# Patient Record
Sex: FEMALE | Race: WHITE | Employment: STUDENT | URBAN - METROPOLITAN AREA
[De-identification: names, ages, dates, MRNs, and addresses within clinical notes are randomized per-mention and may not be internally consistent; named-entity substitution may affect disease eponyms.]

---
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
## 2023-01-24 ENCOUNTER — OFFICE VISIT (OUTPATIENT)
Dept: FAMILY MEDICINE | Facility: CLINIC | Age: 18
End: 2023-01-24
Payer: COMMERCIAL

## 2023-01-24 VITALS
SYSTOLIC BLOOD PRESSURE: 112 MMHG | BODY MASS INDEX: 21.67 KG/M2 | WEIGHT: 107.3 LBS | HEART RATE: 80 BPM | DIASTOLIC BLOOD PRESSURE: 70 MMHG | TEMPERATURE: 99.2 F

## 2023-01-24 DIAGNOSIS — N39.0 ACUTE LOWER UTI: ICD-10-CM

## 2023-01-24 DIAGNOSIS — R30.0 DYSURIA: Primary | ICD-10-CM

## 2023-01-24 LAB
ALBUMIN UR-MCNC: 100 MG/DL
APPEARANCE UR: CLEAR
BACTERIA #/AREA URNS HPF: ABNORMAL /HPF
BILIRUB UR QL STRIP: NEGATIVE
COLOR UR AUTO: YELLOW
GLUCOSE UR STRIP-MCNC: NEGATIVE MG/DL
HGB UR QL STRIP: ABNORMAL
KETONES UR STRIP-MCNC: NEGATIVE MG/DL
LEUKOCYTE ESTERASE UR QL STRIP: ABNORMAL
NITRATE UR QL: NEGATIVE
PH UR STRIP: 7 [PH] (ref 5–7)
RBC #/AREA URNS AUTO: ABNORMAL /HPF
SP GR UR STRIP: 1.01 (ref 1–1.03)
TRANS CELLS #/AREA URNS HPF: ABNORMAL /HPF
UROBILINOGEN UR STRIP-ACNC: 0.2 E.U./DL
WBC #/AREA URNS AUTO: ABNORMAL /HPF

## 2023-01-24 PROCEDURE — 87086 URINE CULTURE/COLONY COUNT: CPT | Performed by: PEDIATRICS

## 2023-01-24 PROCEDURE — 87186 SC STD MICRODIL/AGAR DIL: CPT | Performed by: PEDIATRICS

## 2023-01-24 PROCEDURE — 81001 URINALYSIS AUTO W/SCOPE: CPT | Performed by: PEDIATRICS

## 2023-01-24 PROCEDURE — 99213 OFFICE O/P EST LOW 20 MIN: CPT | Performed by: PEDIATRICS

## 2023-01-24 RX ORDER — SULFAMETHOXAZOLE/TRIMETHOPRIM 800-160 MG
1 TABLET ORAL 2 TIMES DAILY
Qty: 14 TABLET | Refills: 0 | Status: SHIPPED | OUTPATIENT
Start: 2023-01-24 | End: 2023-01-31

## 2023-01-24 NOTE — PROGRESS NOTES
Assessment & Plan         (N39.0) Acute lower UTI          Plan:    Start Bactrim twice daily x7 days for UTI.  Await urine culture.  Will notify if we need to use a different antibiotic.  Recommended she get her annual chlamydia screen.  Return to clinic if not improving as anticipated.    Olivia Reese MD on 1/24/2023 at 6:44 PM          Brandi Lorenz is a 17 year old, presenting for the following health issues:  Urinary Problem      History of Present Illness       Reason for visit:  Bladder infection  Symptom onset:  Today  Symptoms include:  Blood in urine, hurts to pee  Symptom intensity:  Moderate  Symptom progression:  Staying the same  Had these symptoms before:  No  What makes it worse:  No  What makes it better:  No    She eats 2-3 servings of fruits and vegetables daily.She consumes 1 sweetened beverage(s) daily.She exercises with enough effort to increase her heart rate 30 to 60 minutes per day.  She exercises with enough effort to increase her heart rate 4 days per week.   She is taking medications regularly.     Here today for possible UTI.  In the middle of the night woke up and has had increased urinary  Frequency.  Has had blood in her urine.  Has had urgency in the past but never blood before.  No fever.  NO belly or back pain.  Ibuprofen helped belly pain.  Some nausea.  No vaginal discharge issue.      LMP: 1/13/23.    Sexually active males, uses condoms.  Has not yet had annual chlamydia test.      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            Objective    /70 (BP Location: Right arm, Patient Position: Sitting, Cuff Size: Adult Regular)   Pulse 80   Temp 99.2  F (37.3  C) (Tympanic)   Wt 48.7 kg (107 lb 4.8 oz)   LMP 01/13/2023 (Exact Date)   BMI 21.67 kg/m    Body mass index is 21.67 kg/m .     Physical Exam     General: Bright affect, good eye contact, no distress.  Abdominal exam: Positive bowel sounds, no hepatosplenomegaly,  no mass, nondistended nontender.  No CVA tenderness bilaterally.         Latest Reference Range & Units 01/24/23 17:31   Color Urine Colorless, Straw, Light Yellow, Yellow  Yellow   Appearance Urine Clear  Clear   Glucose Urine Negative mg/dL Negative   Bilirubin Urine Negative  Negative   Ketones Urine Negative mg/dL Negative   Specific Gravity Urine 1.003 - 1.035  1.015   pH Urine 5.0 - 7.0  7.0   Protein Albumin Urine Negative mg/dL 100 !   Urobilinogen Urine 0.2, 1.0 E.U./dL 0.2   Nitrite Urine Negative  Negative   Blood Urine Negative  Large !   Leukocyte Esterase Urine Negative  Small !   WBC Urine 0-5 /HPF /HPF 10-25 !   RBC Urine 0-2 /HPF /HPF 10-25 !   Bacteria Urine None Seen /HPF Many !   Transitional Epi None Seen /HPF Few !   !: Data is abnormal

## 2023-01-27 LAB — BACTERIA UR CULT: ABNORMAL

## 2023-04-16 ENCOUNTER — HEALTH MAINTENANCE LETTER (OUTPATIENT)
Age: 18
End: 2023-04-16

## 2023-11-16 ENCOUNTER — PATIENT OUTREACH (OUTPATIENT)
Dept: CARE COORDINATION | Facility: CLINIC | Age: 18
End: 2023-11-16
Payer: COMMERCIAL

## 2023-11-20 ASSESSMENT — ANXIETY QUESTIONNAIRES
2. NOT BEING ABLE TO STOP OR CONTROL WORRYING: NEARLY EVERY DAY
4. TROUBLE RELAXING: NEARLY EVERY DAY
3. WORRYING TOO MUCH ABOUT DIFFERENT THINGS: NEARLY EVERY DAY
7. FEELING AFRAID AS IF SOMETHING AWFUL MIGHT HAPPEN: MORE THAN HALF THE DAYS
5. BEING SO RESTLESS THAT IT IS HARD TO SIT STILL: MORE THAN HALF THE DAYS
6. BECOMING EASILY ANNOYED OR IRRITABLE: NEARLY EVERY DAY
GAD7 TOTAL SCORE: 19
GAD7 TOTAL SCORE: 19
1. FEELING NERVOUS, ANXIOUS, OR ON EDGE: NEARLY EVERY DAY
IF YOU CHECKED OFF ANY PROBLEMS ON THIS QUESTIONNAIRE, HOW DIFFICULT HAVE THESE PROBLEMS MADE IT FOR YOU TO DO YOUR WORK, TAKE CARE OF THINGS AT HOME, OR GET ALONG WITH OTHER PEOPLE: VERY DIFFICULT

## 2023-11-20 ASSESSMENT — PATIENT HEALTH QUESTIONNAIRE - PHQ9
SUM OF ALL RESPONSES TO PHQ QUESTIONS 1-9: 12
10. IF YOU CHECKED OFF ANY PROBLEMS, HOW DIFFICULT HAVE THESE PROBLEMS MADE IT FOR YOU TO DO YOUR WORK, TAKE CARE OF THINGS AT HOME, OR GET ALONG WITH OTHER PEOPLE: VERY DIFFICULT
SUM OF ALL RESPONSES TO PHQ QUESTIONS 1-9: 12

## 2023-11-27 ENCOUNTER — OFFICE VISIT (OUTPATIENT)
Dept: FAMILY MEDICINE | Facility: CLINIC | Age: 18
End: 2023-11-27
Payer: COMMERCIAL

## 2023-11-27 VITALS
TEMPERATURE: 98.3 F | DIASTOLIC BLOOD PRESSURE: 72 MMHG | HEIGHT: 59 IN | OXYGEN SATURATION: 99 % | WEIGHT: 105.2 LBS | SYSTOLIC BLOOD PRESSURE: 112 MMHG | RESPIRATION RATE: 16 BRPM | BODY MASS INDEX: 21.21 KG/M2 | HEART RATE: 84 BPM

## 2023-11-27 DIAGNOSIS — L65.9 HAIR LOSS: ICD-10-CM

## 2023-11-27 DIAGNOSIS — F41.9 ANXIETY: Primary | ICD-10-CM

## 2023-11-27 DIAGNOSIS — Z11.4 SCREENING FOR HIV (HUMAN IMMUNODEFICIENCY VIRUS): ICD-10-CM

## 2023-11-27 LAB
BASOPHILS # BLD AUTO: 0 10E3/UL (ref 0–0.2)
BASOPHILS NFR BLD AUTO: 1 %
EOSINOPHIL # BLD AUTO: 0.2 10E3/UL (ref 0–0.7)
EOSINOPHIL NFR BLD AUTO: 4 %
ERYTHROCYTE [DISTWIDTH] IN BLOOD BY AUTOMATED COUNT: 11.8 % (ref 10–15)
HCT VFR BLD AUTO: 38.9 % (ref 35–47)
HGB BLD-MCNC: 13.2 G/DL (ref 11.7–15.7)
HIV 1+2 AB+HIV1 P24 AG SERPL QL IA: NONREACTIVE
IMM GRANULOCYTES # BLD: 0 10E3/UL
IMM GRANULOCYTES NFR BLD: 0 %
LYMPHOCYTES # BLD AUTO: 1.9 10E3/UL (ref 0.8–5.3)
LYMPHOCYTES NFR BLD AUTO: 46 %
MCH RBC QN AUTO: 31 PG (ref 26.5–33)
MCHC RBC AUTO-ENTMCNC: 33.9 G/DL (ref 31.5–36.5)
MCV RBC AUTO: 91 FL (ref 78–100)
MONOCYTES # BLD AUTO: 0.3 10E3/UL (ref 0–1.3)
MONOCYTES NFR BLD AUTO: 8 %
NEUTROPHILS # BLD AUTO: 1.8 10E3/UL (ref 1.6–8.3)
NEUTROPHILS NFR BLD AUTO: 42 %
PLATELET # BLD AUTO: 219 10E3/UL (ref 150–450)
RBC # BLD AUTO: 4.26 10E6/UL (ref 3.8–5.2)
TSH SERPL DL<=0.005 MIU/L-ACNC: 1.85 UIU/ML (ref 0.3–4.2)
WBC # BLD AUTO: 4.2 10E3/UL (ref 4–11)

## 2023-11-27 PROCEDURE — 36415 COLL VENOUS BLD VENIPUNCTURE: CPT | Performed by: PEDIATRICS

## 2023-11-27 PROCEDURE — 99214 OFFICE O/P EST MOD 30 MIN: CPT | Mod: 25 | Performed by: PEDIATRICS

## 2023-11-27 PROCEDURE — 90471 IMMUNIZATION ADMIN: CPT | Performed by: PEDIATRICS

## 2023-11-27 PROCEDURE — 84443 ASSAY THYROID STIM HORMONE: CPT | Performed by: PEDIATRICS

## 2023-11-27 PROCEDURE — 85025 COMPLETE CBC W/AUTO DIFF WBC: CPT | Mod: QW | Performed by: PEDIATRICS

## 2023-11-27 PROCEDURE — 90715 TDAP VACCINE 7 YRS/> IM: CPT | Performed by: PEDIATRICS

## 2023-11-27 PROCEDURE — 87389 HIV-1 AG W/HIV-1&-2 AB AG IA: CPT | Performed by: PEDIATRICS

## 2023-11-27 RX ORDER — SERTRALINE HYDROCHLORIDE 25 MG/1
25 TABLET, FILM COATED ORAL DAILY
Qty: 30 TABLET | Refills: 3 | Status: SHIPPED | OUTPATIENT
Start: 2023-11-27 | End: 2023-12-18

## 2023-11-27 ASSESSMENT — ENCOUNTER SYMPTOMS: NERVOUS/ANXIOUS: 1

## 2023-11-27 NOTE — PROGRESS NOTES
Assessment & Plan     Anxiety    - sertraline (ZOLOFT) 25 MG tablet; Take 1 tablet (25 mg) by mouth daily    Screening for HIV (human immunodeficiency virus)    - HIV Antigen Antibody Combo; Future  - HIV Antigen Antibody Combo    Hair loss    - CBC with platelets and differential; Future  - TSH with free T4 reflex; Future  - CBC with platelets and differential  - TSH with free T4 reflex       Depression Screening Follow Up        11/20/2023     3:18 PM   PHQ   PHQ-9 Total Score 12   Q9: Thoughts of better off dead/self-harm past 2 weeks Not at all         Plan:    Will start low-dose SSRI sertraline 25 mg once daily.  Reviewed box warning.  Will plan to see her back in 2 to 3 weeks for possible titration.  Will ask Jaqueline to reach out to her to start some cognitive behavioral therapy.  She also acknowledges that there is options on campus if needed.  Will check a baseline CBC, TSH and HIV screen.  Return to clinic in 2 to 3 weeks for recheck, sooner if needed.    Olivia Reese MD on 11/27/2023 at 7:20 AM    For billing purposes only: I spent 30 minutes on the date of the encounter during chart review, history and exam, documentation and further activities as noted above.    Brandi Lorenz is a 18 year old, presenting for the following health issues:  Thyroid Problem (Family hx of hypothyroidism on mom's side (mom and grandma)/Puffy face, weight gain x 1 year; noticed hair loss about a month or 2 ago) and Anxiety (Would like to discuss starting medication - has gotten worse this semester )      11/27/2023     6:56 AM   Additional Questions   Roomed by JANY Correa   Accompanied by self       Anxiety    History of Present Illness       Mental Health Follow-up:  Patient presents to follow-up on Anxiety.    Patient's anxiety since last visit has been:  Worse  The patient is having other symptoms associated with anxiety.  Any significant life events: No  Patient is feeling anxious or having panic attacks.  Patient  has no concerns about alcohol or drug use.    She eats 2-3 servings of fruits and vegetables daily.She consumes 0 sweetened beverage(s) daily.She exercises with enough effort to increase her heart rate 20 to 29 minutes per day.  She exercises with enough effort to increase her heart rate 3 or less days per week.   She is taking medications regularly.                 Social History     Tobacco Use    Smoking status: Never     Passive exposure: Never    Smokeless tobacco: Never    Tobacco comments:     No smokers in home   Vaping Use    Vaping Use: Never used   Substance Use Topics    Alcohol use: No    Drug use: No         11/20/2023     3:19 PM   ZULY-7 SCORE   Total Score 19 (severe anxiety)   Total Score 19         11/20/2023     3:18 PM   PHQ   PHQ-9 Total Score 12   Q9: Thoughts of better off dead/self-harm past 2 weeks Not at all         Here today with concerns about anxiety.    Over the last several months has noticed she has had increased frequency of feeling anxious at baseline.  She is having difficulty focusing in class.  Some troubles falling asleep at night.  She feels like she is always on the go and moving.  She has baseline history of anxiety but never barbara to a point where she felt she needed to do something different.  Is interested in medication and therapy.    Also worried about thyroid issues.  Mother and maternal grandmother diagnosed in their 20s with hypothyroidism.  She feels that she has had some hair loss and weight fluctuation.    Appetite is okay although she acknowledges she does not eat as much in the dining garnica due to options.  She sometimes worries that she has not eaten enough during the day and then overeats or binges in the evening.  Denies any past history of eating disorder issue.    Menstrual cycle has been regular.  She does feel fatigued especially after eating.      Review of Systems   Psychiatric/Behavioral:  The patient is nervous/anxious.             Objective    BP  "112/72   Pulse 84   Temp 98.3  F (36.8  C) (Tympanic)   Resp 16   Ht 1.499 m (4' 11.02\")   Wt 47.7 kg (105 lb 3.2 oz)   LMP 11/16/2023 (Exact Date)   SpO2 99%   BMI 21.24 kg/m    Body mass index is 21.24 kg/m .      Physical Exam     General:  Alert and oriented, No acute distress.    Neck:  No lymphadenopathy.  No thyromegaly.  Respiratory:  Lungs clear to auscultation bilaterally.  Equal air entry.  Symmetrical chest expansion.  No wheezing.    Cardiovascular:  S1 and S2 with regular rate and rhythm.  No murmurs.    Neurologic:  No focal deficits.                "

## 2023-11-28 ENCOUNTER — TELEPHONE (OUTPATIENT)
Dept: PEDIATRICS | Facility: OTHER | Age: 18
End: 2023-11-28
Payer: COMMERCIAL

## 2023-11-28 NOTE — TELEPHONE ENCOUNTER
11/28/23  Pt's mom called to get assistance in determining if [facility] Luna is in her insurance network. She stated she already spoke to the business office and they did not know how to help. Gave mom the number for behavioral: [phone]  [place]

## 2023-11-30 ENCOUNTER — PATIENT OUTREACH (OUTPATIENT)
Dept: CARE COORDINATION | Facility: CLINIC | Age: 18
End: 2023-11-30
Payer: COMMERCIAL

## 2023-12-17 ASSESSMENT — ANXIETY QUESTIONNAIRES
2. NOT BEING ABLE TO STOP OR CONTROL WORRYING: SEVERAL DAYS
6. BECOMING EASILY ANNOYED OR IRRITABLE: MORE THAN HALF THE DAYS
1. FEELING NERVOUS, ANXIOUS, OR ON EDGE: SEVERAL DAYS
GAD7 TOTAL SCORE: 8
GAD7 TOTAL SCORE: 8
3. WORRYING TOO MUCH ABOUT DIFFERENT THINGS: MORE THAN HALF THE DAYS
4. TROUBLE RELAXING: SEVERAL DAYS
7. FEELING AFRAID AS IF SOMETHING AWFUL MIGHT HAPPEN: NOT AT ALL
IF YOU CHECKED OFF ANY PROBLEMS ON THIS QUESTIONNAIRE, HOW DIFFICULT HAVE THESE PROBLEMS MADE IT FOR YOU TO DO YOUR WORK, TAKE CARE OF THINGS AT HOME, OR GET ALONG WITH OTHER PEOPLE: SOMEWHAT DIFFICULT
5. BEING SO RESTLESS THAT IT IS HARD TO SIT STILL: SEVERAL DAYS

## 2023-12-18 ENCOUNTER — OFFICE VISIT (OUTPATIENT)
Dept: FAMILY MEDICINE | Facility: CLINIC | Age: 18
End: 2023-12-18
Payer: COMMERCIAL

## 2023-12-18 VITALS
TEMPERATURE: 97.9 F | HEIGHT: 60 IN | DIASTOLIC BLOOD PRESSURE: 64 MMHG | WEIGHT: 104.5 LBS | RESPIRATION RATE: 18 BRPM | SYSTOLIC BLOOD PRESSURE: 116 MMHG | OXYGEN SATURATION: 99 % | BODY MASS INDEX: 20.52 KG/M2 | HEART RATE: 74 BPM

## 2023-12-18 DIAGNOSIS — F41.9 ANXIETY: Primary | ICD-10-CM

## 2023-12-18 PROCEDURE — 99213 OFFICE O/P EST LOW 20 MIN: CPT | Performed by: PEDIATRICS

## 2023-12-18 ASSESSMENT — PATIENT HEALTH QUESTIONNAIRE - PHQ9
10. IF YOU CHECKED OFF ANY PROBLEMS, HOW DIFFICULT HAVE THESE PROBLEMS MADE IT FOR YOU TO DO YOUR WORK, TAKE CARE OF THINGS AT HOME, OR GET ALONG WITH OTHER PEOPLE: SOMEWHAT DIFFICULT
SUM OF ALL RESPONSES TO PHQ QUESTIONS 1-9: 8
10. IF YOU CHECKED OFF ANY PROBLEMS, HOW DIFFICULT HAVE THESE PROBLEMS MADE IT FOR YOU TO DO YOUR WORK, TAKE CARE OF THINGS AT HOME, OR GET ALONG WITH OTHER PEOPLE: SOMEWHAT DIFFICULT

## 2023-12-18 NOTE — PROGRESS NOTES
Assessment & Plan     Anxiety    - sertraline (ZOLOFT) 50 MG tablet; Take 1 tablet (50 mg) by mouth daily        Plan:    Will increase Zoloft to 50 mg once daily.  Agree with getting in with Jaqueline.  Discussed consideration of hepatitis A vaccine prior to her trip to [place].  Encouraged her to discuss with her mom whether this was something she had received as a child or not. Can schedule vaccine only appointment if needed.   Return to clinic in 6 to 8 weeks for follow-up, sooner if needed.    Olivia Reese MD on 12/18/2023 at 11:01 AM      Brandi Lorenz is a 18 year old, presenting for the following health issues:  Medication Therapy Management      12/18/2023    10:34 AM   Additional Questions   Roomed by Iva       History of Present Illness       Mental Health Follow-up:  Patient presents to follow-up on Anxiety.    Patient's anxiety since last visit has been:  Medium  The patient is not having other symptoms associated with anxiety.  Any significant life events: No  Patient is feeling anxious or having panic attacks.  Patient has no concerns about alcohol or drug use.    She eats 2-3 servings of fruits and vegetables daily.She consumes 0 sweetened beverage(s) daily.She exercises with enough effort to increase her heart rate 20 to 29 minutes per day.  She exercises with enough effort to increase her heart rate 4 days per week.   She is taking medications regularly.       Here today for follow-up on anxiety medications.  Overall feels things are going well.  Medications have taken the edge of the anxiety but it does still seem to be there.  She notes she has finals coming up.  Will then be going on a cruise with her family over break.  Has felt a little sluggish.  Not sure if that is the medication or something else.  She has had trouble falling asleep a few times when she has gone home for the weekend but otherwise does not have any sleep concerns.  Appetite is about the same.  Finds the cafeteria  "food to be an issue but does not have concerns about her intake right now.  Will be meeting with Jaqueline tomorrow for her first visit.        Objective    /64 (BP Location: Right arm, Patient Position: Sitting, Cuff Size: Adult Regular)   Pulse 74   Temp 97.9  F (36.6  C) (Oral)   Resp 18   Ht 1.528 m (5' 0.14\")   Wt 47.4 kg (104 lb 8 oz)   LMP 12/17/2023 (Exact Date)   SpO2 99%   BMI 20.32 kg/m    Body mass index is 20.32 kg/m .    Physical Exam     General: Bright affect, good eye contact, mood congruent, well-appearing              11/20/2023     3:18 PM 12/18/2023     8:42 AM   PHQ   PHQ-9 Total Score 12 8    8   Q9: Thoughts of better off dead/self-harm past 2 weeks Not at all Not at all    Not at all         11/20/2023     3:19 PM 12/17/2023     6:01 PM   ZULY-7 SCORE   Total Score 19 (severe anxiety) 8 (mild anxiety)   Total Score 19 8             "

## 2023-12-19 ENCOUNTER — OFFICE VISIT (OUTPATIENT)
Dept: BEHAVIORAL HEALTH | Facility: CLINIC | Age: 18
End: 2023-12-19
Payer: COMMERCIAL

## 2023-12-19 DIAGNOSIS — F41.9 ANXIETY DISORDER, UNSPECIFIED TYPE: Primary | ICD-10-CM

## 2023-12-19 PROCEDURE — 90834 PSYTX W PT 45 MINUTES: CPT | Performed by: SOCIAL WORKER

## 2023-12-19 ASSESSMENT — COLUMBIA-SUICIDE SEVERITY RATING SCALE - C-SSRS
1. HAVE YOU WISHED YOU WERE DEAD OR WISHED YOU COULD GO TO SLEEP AND NOT WAKE UP?: NO
6. HAVE YOU EVER DONE ANYTHING, STARTED TO DO ANYTHING, OR PREPARED TO DO ANYTHING TO END YOUR LIFE?: NO
TOTAL  NUMBER OF ABORTED OR SELF INTERRUPTED ATTEMPTS LIFETIME: NO
2. HAVE YOU ACTUALLY HAD ANY THOUGHTS OF KILLING YOURSELF?: NO
ATTEMPT LIFETIME: NO
TOTAL  NUMBER OF INTERRUPTED ATTEMPTS LIFETIME: NO

## 2023-12-19 NOTE — PROGRESS NOTES
[facility] Primary Care: Integrated Behavioral Health  December 19, 2023      Behavioral Health Clinician Progress Note    Patient Name: Gerda Pop           Service Type:  Individual      Service Location:   Face to Face in Clinic     Session Start Time: 8:20a  Session End Time: 8:10a      Session Length: 38 - 52      Attendees: Client     Service Modality:  In-person    Visit Activities (Refresh list every visit): [facility] Only    Diagnostic Assessment Date: to be completed by 3rd visit  Treatment Plan Review Date: to be completed after DA  See Flowsheets for today's PHQ-9 and ZULY-7 results  Previous PHQ-9:       11/20/2023     3:18 PM 12/18/2023     8:42 AM   PHQ-9 SCORE   PHQ-9 Total Score MyChart 12 (Moderate depression) 8 (Mild depression)   PHQ-9 Total Score 12 8    8     Previous ZULY-7:       11/20/2023     3:19 PM 12/17/2023     6:01 PM   ZULY-7 SCORE   Total Score 19 (severe anxiety) 8 (mild anxiety)   Total Score 19 8         DATA  Extended Session (60+ minutes): No  Interactive Complexity: No  Crisis: No  [facility] Patient: No    Treatment Objective(s) Addressed in This Session:  Target Behavior(s): disease management/lifestyle changes anxiety, stress    Anxiety: will experience a reduction in anxiety, will develop more effective coping skills to manage anxiety symptoms, and will develop healthy cognitive patterns and beliefs    Current Stressors / Issues:  This is pt's initial visit with [facility].  Pt was referred by PCP due to increased anxiety.  Pt shares that she has been experiencing increased anxiety the past several months primarily related to classes.  Pt shares that she is a sophomore at [employer] and is studying Bio Medical studies.  Pt shares that she will transfer to the [employer] for her senior year.  Pt lives in the dorms with her best friend from High School and reports that things are going well with her living situation.  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  Pt is from [place] and family still lives there.  Pt shares she is close to family and goes home sometimes on Saturdays.    This week is Finals week and has 1 final tomorrow and 2 on Thursday.  Plans to spend her entire break in [place] and will work at the [employer] there and family is also going on a [employer] Cruise.    Pt shares that her anxiety often related to social situations and also to homework and taking tests.  She admits to worrying about the future a lot.  Pt shares that she often sleeps ok but if she doesn't have much going on in a day she struggles with falling asleep at night.    C and pt discussed CBT and challenging anxious/negative thinking.  Discussed relaxation and grounding tools as well for pt to use when worrying and having physical anxiety.    Pt set up an appt for after break.  She was given number to call and arrange an appt during break if she feels it would be helpful after she has her work schedule.    Pt denies any SI.       Progress on Treatment Objective(s) / Homework:  Initial visit    Motivational Interviewing    MI Intervention: Expressed Empathy/Understanding, Supported Autonomy, Collaboration, Evocation, Permission to raise concern or advise, Open-ended questions, and Reflections: simple and complex     Change Talk Expressed by the Patient: Desire to change    Provider Response to Change Talk: E - Evoked more info from patient about behavior change, A - Affirmed patient's thoughts, decisions, or attempts at behavior change, R - Reflected patient's change talk, and S - Summarized patient's change talk statements    Also provided psychoeducation about behavioral health condition, symptoms, and treatment options    Cognitive-Behavioral Therapy    Assessments completed prior to visit:  The following assessments were completed by patient for this visit:  PHQ9:       11/20/2023     3:18 PM 12/18/2023     8:42 AM   PHQ-9 SCORE   PHQ-9 Total Score  MyChart 12 (Moderate depression) 8 (Mild depression)   PHQ-9 Total Score 12 8    8     GAD2:       12/17/2023     6:30 PM   ZULY-2   Feeling nervous, anxious, or on edge 2   Not being able to stop or control worrying 1   ZULY-2 Total Score 3     GAD7:       11/20/2023     3:19 PM 12/17/2023     6:01 PM   ZULY-7 SCORE   Total Score 19 (severe anxiety) 8 (mild anxiety)   Total Score 19 8     CAGE-AID:       12/17/2023     6:33 PM   CAGE-AID Total Score   Total Score 2   Total Score MyChart 2 (A total score of 2 or greater is considered clinically significant)     PROMIS 10-Global Health (only subscores and total score):       12/17/2023     6:32 PM   PROMIS-10 Scores Only   Global Mental Health Score 12   Global Physical Health Score 14   PROMIS TOTAL - SUBSCORES 26     [place]vin Suicide Severity Rating Scale (Lifetime/Recent)      12/19/2023     9:35 AM   [place] Suicide Severity Rating (Lifetime/Recent)   Q1 Wish to be Dead (Lifetime) N   Q2 Non-Specific Active Suicidal Thoughts (Lifetime) N   Actual Attempt (Lifetime) N   Has subject engaged in non-suicidal self-injurious behavior? (Lifetime) N   Interrupted Attempts (Lifetime) N   Aborted or Self-Interrupted Attempt (Lifetime) N   Preparatory Acts or Behavior (Lifetime) N   Calculated C-SSRS Risk Score (Lifetime/Recent) No Risk Indicated       Care Plan review completed: No    Medication Review:  Changes to psychiatric medications, see updated Medication List in EPIC. - recently started zoloft    Medication Compliance:  Yes    Changes in Health Issues:   None reported    Chemical Use Review:   Substance Use: Chemical use reviewed, no active concerns identified      Tobacco Use: No current tobacco use.      Assessment: Current Emotional / Mental Status (status of significant symptoms):  Risk status (Self / Other harm or suicidal ideation)  Patient denies a history of suicidal ideation, suicide attempts, self-injurious behavior, homicidal ideation, homicidal behavior,  and and other safety concerns  Patient denies current fears or concerns for personal safety.  Patient denies current or recent suicidal ideation or behaviors.  Patient denies current or recent homicidal ideation or behaviors.  Patient denies current or recent self injurious behavior or ideation.  Patient denies other safety concerns.  A safety and risk management plan has not been developed at this time, however patient was encouraged to call [facility] should there be a change in any of these risk factors.    Appearance:   Appropriate   Eye Contact:   Good   Psychomotor Behavior: Normal   Attitude:   Cooperative   Orientation:   All  Speech   Rate / Production: Normal    Volume:  Normal   Mood:    Anxious (mildly)  Affect:    Appropriate   Thought Content:  Clear   Thought Form:  Coherent  Logical   Insight:    Good     Diagnoses:  1. Anxiety disorder, unspecified type        Collateral Reports Completed:  Not Applicable    Plan: (Homework, other):  Patient was given information about behavioral services and encouraged to schedule a follow up appointment with the clinic [facility] in 1 month.  Pt given # she can call if wants to schedule virtual visit during break.  Doesn't have work schedule yet. She was also given information about mental health symptoms and treatment options .  CD Recommendations: No indications of CD issues.         Jaqueline Luna, ARVIN, LICSW, [facility]   December 19, 2023

## 2024-01-23 ENCOUNTER — OFFICE VISIT (OUTPATIENT)
Dept: BEHAVIORAL HEALTH | Facility: CLINIC | Age: 19
End: 2024-01-23
Payer: COMMERCIAL

## 2024-01-23 DIAGNOSIS — F41.9 ANXIETY DISORDER, UNSPECIFIED TYPE: Primary | ICD-10-CM

## 2024-01-23 PROCEDURE — 90834 PSYTX W PT 45 MINUTES: CPT | Performed by: SOCIAL WORKER

## 2024-01-23 NOTE — PROGRESS NOTES
[facility] Primary Care: Integrated Behavioral Health  January 23, 2024        Behavioral Health Clinician Progress Note    Patient Name: Gerda Pop           Service Type:  Individual      Service Location:   Face to Face in Clinic     Session Start Time: 10:30a  Session End Time: 11:13a      Session Length: 38 - 52      Attendees: Client     Service Modality:  In-person    Visit Activities (Refresh list every visit): [facility] Only    Diagnostic Assessment Date: to be completed by 3rd visit  Treatment Plan Review Date: to be completed after DA  See Flowsheets for today's PHQ-9 and ZULY-7 results  Previous PHQ-9:       11/20/2023     3:18 PM 12/18/2023     8:42 AM   PHQ-9 SCORE   PHQ-9 Total Score MyChart 12 (Moderate depression) 8 (Mild depression)   PHQ-9 Total Score 12 8    8     Previous ZULY-7:       11/20/2023     3:19 PM 12/17/2023     6:01 PM   ZULY-7 SCORE   Total Score 19 (severe anxiety) 8 (mild anxiety)   Total Score 19 8         DATA  Extended Session (60+ minutes): No  Interactive Complexity: No  Crisis: No  [facility] Patient: No    Treatment Objective(s) Addressed in This Session:  Target Behavior(s): disease management/lifestyle changes anxiety, stress    Anxiety: will experience a reduction in anxiety, will develop more effective coping skills to manage anxiety symptoms, and will develop healthy cognitive patterns and beliefs    Current Stressors / Issues:  Started back at school yesterday after a long winter break.  Pt shares that break went well though maybe had a bit too much time with family.  Pt shares that she has no classes on Tuesdays which she feels will be nice.  Does think that she will have a heavy workload though has some good friends in classes which is thankful for.  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    Admits that over break sleep was a little off.  Is going to work and try to get  back on a sleep schedule and stick to it.    Pt shares that she is using the grounding tools and challenging anxious thoughts and finding them helpful.  Pt not sure if needs to reschedule at this time.  [facility] will reach out to pt in about 1 month to check in.        Progress on Treatment Objective(s) / Homework:  Satisfactory progress - ACTION (Actively working towards change); Intervened by reinforcing change plan / affirming steps taken    Motivational Interviewing    MI Intervention: Expressed Empathy/Understanding, Supported Autonomy, Collaboration, Evocation, Permission to raise concern or advise, Open-ended questions, and Reflections: simple and complex     Change Talk Expressed by the Patient: Desire to change    Provider Response to Change Talk: E - Evoked more info from patient about behavior change, A - Affirmed patient's thoughts, decisions, or attempts at behavior change, R - Reflected patient's change talk, and S - Summarized patient's change talk statements    Also provided psychoeducation about behavioral health condition, symptoms, and treatment options    Cognitive-Behavioral Therapy    Assessments completed prior to visit:  The following assessments were completed by patient for this visit:  PHQ9:       11/20/2023     3:18 PM 12/18/2023     8:42 AM   PHQ-9 SCORE   PHQ-9 Total Score MyChart 12 (Moderate depression) 8 (Mild depression)   PHQ-9 Total Score 12 8    8     GAD2:       12/17/2023     6:30 PM   ZULY-2   Feeling nervous, anxious, or on edge 2   Not being able to stop or control worrying 1   ZULY-2 Total Score 3     GAD7:       11/20/2023     3:19 PM 12/17/2023     6:01 PM   ZULY-7 SCORE   Total Score 19 (severe anxiety) 8 (mild anxiety)   Total Score 19 8     CAGE-AID:       12/17/2023     6:33 PM   CAGE-AID Total Score   Total Score 2   Total Score MyChart 2 (A total score of 2 or greater is considered clinically significant)     PROMIS 10-Global Health (only subscores and total score):        12/17/2023     6:32 PM   PROMIS-10 Scores Only   Global Mental Health Score 12   Global Physical Health Score 14   PROMIS TOTAL - SUBSCORES 26     [place] Suicide Severity Rating Scale (Lifetime/Recent)      12/19/2023     9:35 AM   [place] Suicide Severity Rating (Lifetime/Recent)   Q1 Wish to be Dead (Lifetime) N   Q2 Non-Specific Active Suicidal Thoughts (Lifetime) N   Actual Attempt (Lifetime) N   Has subject engaged in non-suicidal self-injurious behavior? (Lifetime) N   Interrupted Attempts (Lifetime) N   Aborted or Self-Interrupted Attempt (Lifetime) N   Preparatory Acts or Behavior (Lifetime) N   Calculated C-SSRS Risk Score (Lifetime/Recent) No Risk Indicated       Care Plan review completed: No    Medication Review:  Changes to psychiatric medications, see updated Medication List in EPIC. - recently started zoloft    Medication Compliance:  Yes    Changes in Health Issues:   None reported    Chemical Use Review:   Substance Use: Chemical use reviewed, no active concerns identified      Tobacco Use: No current tobacco use.      Assessment: Current Emotional / Mental Status (status of significant symptoms):  Risk status (Self / Other harm or suicidal ideation)  Patient denies a history of suicidal ideation, suicide attempts, self-injurious behavior, homicidal ideation, homicidal behavior, and and other safety concerns  Patient denies current fears or concerns for personal safety.  Patient denies current or recent suicidal ideation or behaviors.  Patient denies current or recent homicidal ideation or behaviors.  Patient denies current or recent self injurious behavior or ideation.  Patient denies other safety concerns.  A safety and risk management plan has not been developed at this time, however patient was encouraged to call [place] [phone] should there be a change in any of these risk factors.    Appearance:   Appropriate   Eye Contact:   Good   Psychomotor Behavior: Normal   Attitude:   Cooperative    Orientation:   All  Speech   Rate / Production: Normal    Volume:  Normal   Mood:    Normal  Affect:    Appropriate   Thought Content:  Clear   Thought Form:  Coherent  Logical   Insight:    Good     Diagnoses:  1. Anxiety disorder, unspecified type          Collateral Reports Completed:  Not Applicable    Plan: (Homework, other):  Patient was given information about behavioral services and encouraged to schedule a follow up appointment with the clinic [facility] in 1 month, as needed.   She was also given information about mental health symptoms and treatment options .  CD Recommendations: No indications of CD issues.         ARVIN Lerma, LICSW, [facility]   January 23, 2024

## 2024-02-04 ENCOUNTER — HEALTH MAINTENANCE LETTER (OUTPATIENT)
Age: 19
End: 2024-02-04

## 2024-11-23 ENCOUNTER — OFFICE VISIT (OUTPATIENT)
Dept: URGENT CARE | Facility: URGENT CARE | Age: 19
End: 2024-11-23
Payer: COMMERCIAL

## 2024-11-23 VITALS
BODY MASS INDEX: 19.97 KG/M2 | RESPIRATION RATE: 24 BRPM | WEIGHT: 102.7 LBS | HEART RATE: 67 BPM | TEMPERATURE: 96.7 F | SYSTOLIC BLOOD PRESSURE: 110 MMHG | DIASTOLIC BLOOD PRESSURE: 69 MMHG | OXYGEN SATURATION: 100 %

## 2024-11-23 DIAGNOSIS — J06.9 UPPER RESPIRATORY TRACT INFECTION, UNSPECIFIED TYPE: Primary | ICD-10-CM

## 2024-11-23 DIAGNOSIS — R05.1 ACUTE COUGH: ICD-10-CM

## 2024-11-23 LAB — SARS-COV-2 RNA RESP QL NAA+PROBE: NEGATIVE

## 2024-11-23 PROCEDURE — 87798 DETECT AGENT NOS DNA AMP: CPT | Performed by: PHYSICIAN ASSISTANT

## 2024-11-23 PROCEDURE — 99213 OFFICE O/P EST LOW 20 MIN: CPT | Performed by: PHYSICIAN ASSISTANT

## 2024-11-23 PROCEDURE — 87635 SARS-COV-2 COVID-19 AMP PRB: CPT | Performed by: PHYSICIAN ASSISTANT

## 2024-11-23 RX ORDER — BENZONATATE 200 MG/1
200 CAPSULE ORAL 3 TIMES DAILY PRN
Qty: 30 CAPSULE | Refills: 0 | Status: SHIPPED | OUTPATIENT
Start: 2024-11-23

## 2024-11-23 NOTE — PROGRESS NOTES
Assessment & Plan     Upper respiratory tract infection, unspecified type  Likely viral.   Push fluids, rest and ibuprofen or tylenol for comfort.    RTC for persistent or worsening sx.   Will check covid 19.  If positive she meets indication for antiviral    Given she is on campus, recommend testing for pertussis as well.    At the end of the encounter, I discussed results, diagnosis, medications. Discussed red flags for immediate return to clinic/ER, as well as indications for follow up if no improvement. Patient understood and agreed to plan. Patient was stable for discharge      - COVID-19 Virus (Coronavirus) by PCR Nose  - B. pertussis/parapertussis PCR-NP    Acute cough  Trial of tessalon.    - benzonatate (TESSALON) 200 MG capsule  Dispense: 30 capsule; Refill: 0  - COVID-19 Virus (Coronavirus) by PCR Nose  - B. pertussis/parapertussis PCR-NP  }    Return for 2 weeks if not improved, sooner if worsening..    Salina Castillo PA-C  [facility]    Brandi Lorenz is a 19 year old female who presents to clinic today for the following health issues:  Chief Complaint   Patient presents with    Cough     Bad cough-keeping awake at night, started as a scratchy throat earlier in the week and turned to cough       HPI  Pt is a 19 year old female with hx of environmental allergies currently well controlled, with 5 day hx of cough and cold sx.    Some sob especially at night.    No fevers.   No CP     Rhinorrhea, congestion.    No facial pain or pressure.   Ears full but better now.    No hx of asthma or RAD.    Similar cough in the past     Allergies:  FAIR control.    Exposure: bronchitis and pneumonia.    Cough to near emesis    Live with other students, not in doorms.         Review of Systems  Constitutional, HEENT, cardiovascular, pulmonary, gi and gu systems are negative, except as otherwise noted.    Patient Active Problem List   Diagnosis    Elevated SGOT (AST)    Seasonal  allergic rhinitis    Chronic idiopathic urticaria    Seasonal allergic rhinitis due to pollen    Allergic rhinitis due to animal (cat) (dog) hair and dander    Allergic rhinitis due to mold    Allergic rhinitis due to dust mite    Angioedema, subsequent encounter     Current Outpatient Medications   Medication Sig Dispense Refill    benzonatate (TESSALON) 200 MG capsule Take 1 capsule (200 mg) by mouth 3 times daily as needed for cough. 30 capsule 0    loratadine (CLARITIN) 10 MG tablet Take 10 mg by mouth daily      sertraline (ZOLOFT) 50 MG tablet Take 1 tablet (50 mg) by mouth daily (Patient not taking: Reported on 11/23/2024) 90 tablet 3     No current facility-administered medications for this visit.           Objective    /69   Pulse 67   Temp (!) 96.7  F (35.9  C) (Tympanic)   Resp 24   Wt 46.6 kg (102 lb 11.2 oz)   SpO2 100%   BMI 19.97 kg/m    Physical Exam   Pt is in no acute distress and appears well  Ears patent B:  TM s intact, non-injected. All land marks easily visibile    Nasal mucosa is non-edematous, no discharge.    Pharynx: non erythematous, tonsils non hypertrophied, No exudate   Neck supple: no adenopathy  Lungs: CTA  Heart: RRR, no murmur, no thrills or heaves   Ext: no edema  Skin: no rashes    No results found for any visits on 11/23/24.

## 2024-11-24 LAB
B PARAPERT DNA SPEC QL NAA+PROBE: NOT DETECTED
B PERT DNA SPEC QL NAA+PROBE: NOT DETECTED

## 2024-12-16 SDOH — HEALTH STABILITY: PHYSICAL HEALTH: ON AVERAGE, HOW MANY DAYS PER WEEK DO YOU ENGAGE IN MODERATE TO STRENUOUS EXERCISE (LIKE A BRISK WALK)?: 3 DAYS

## 2024-12-16 SDOH — HEALTH STABILITY: PHYSICAL HEALTH: ON AVERAGE, HOW MANY MINUTES DO YOU ENGAGE IN EXERCISE AT THIS LEVEL?: 10 MIN

## 2024-12-16 ASSESSMENT — SOCIAL DETERMINANTS OF HEALTH (SDOH): HOW OFTEN DO YOU GET TOGETHER WITH FRIENDS OR RELATIVES?: MORE THAN THREE TIMES A WEEK

## 2024-12-19 ENCOUNTER — OFFICE VISIT (OUTPATIENT)
Dept: FAMILY MEDICINE | Facility: CLINIC | Age: 19
End: 2024-12-19
Payer: COMMERCIAL

## 2024-12-19 VITALS
OXYGEN SATURATION: 99 % | DIASTOLIC BLOOD PRESSURE: 64 MMHG | RESPIRATION RATE: 18 BRPM | HEART RATE: 88 BPM | TEMPERATURE: 98.1 F | BODY MASS INDEX: 19.81 KG/M2 | WEIGHT: 100.9 LBS | SYSTOLIC BLOOD PRESSURE: 102 MMHG | HEIGHT: 60 IN

## 2024-12-19 DIAGNOSIS — Z12.4 SCREENING FOR CERVICAL CANCER: ICD-10-CM

## 2024-12-19 DIAGNOSIS — Z00.00 ROUTINE GENERAL MEDICAL EXAMINATION AT A HEALTH CARE FACILITY: Primary | ICD-10-CM

## 2024-12-19 DIAGNOSIS — Z23 NEED FOR VACCINATION: ICD-10-CM

## 2024-12-19 NOTE — PROGRESS NOTES
Preventive Care Visit  [facility] MIAH Arcos CNP, Nurse Practitioner Primary Care  Dec 19, 2024      Assessment & Plan     Routine general medical examination at a health care facility  Routine screenings and immunizations reviewed.  She is agreeable to cervical cancer screening today.  Manual breast exam within normal limits.  Defers influenza and COVID-vaccine.  We discussed that these are recommended vaccines for travel to [facility].  We reviewed the CDC traveler guidelines.  She is able to hepatitis A and MenB vaccines.  Discussed hepatitis A as a foodborne illness.    Need for vaccination  - MENINGOCOCCAL B (BEXSERO )  - HEPATITIS A 19+ (HAVRIX/VAQTA)    Screening for cervical cancer  History of sexual activity but not currently sexually active.  No concerning symptoms.  We discussed Pap smear in detail today and she procedure well.  She deferred Chlamydia screening.  If Pap smear within normal limits, she understands will be due again in 3 years.  - Gynecologic Cytology (PAP Smear)            Counseling  Appropriate preventive services were addressed with this patient via screening, questionnaire, or discussion as appropriate for fall prevention, nutrition, physical activity, Tobacco-use cessation, social engagement, weight loss and cognition.  Checklist reviewing preventive services available has been given to the patient.  Reviewed patient's diet, addressing concerns and/or questions.   She is at risk for lack of exercise and has been provided with information to increase physical activity for the benefit of her well-being.   She is at risk for psychosocial distress and has been provided with information to reduce risk.           Brandi Lorenz is a 19 year old, presenting for the following:  Physical (Annual Preventative, pt is going to [facility] to study abroad for a month and would like to make sure she is caught up on everything)        12/19/2024     8:01 AM    Additional Questions   Roomed by JANY Beltre          Gerda is a pleasant 19-year-old female who presents today for complete physical prior to leaving for study abroad course in [facility].  She leaves next week on [place] and will be gone for about 1 month.  Overall is feeling well and has no concerns today.  She is to be on sertraline and thought it was kind of helpful but did not renew it.  She was taking this for about a year.  She feels her anxiety symptoms are under good control.             Health Care Directive  Patient does not have a Health Care Directive: Discussed advance care planning with patient; however, patient declined at this time.      12/16/2024   General Health   How would you rate your overall physical health? Good   Feel stress (tense, anxious, or unable to sleep) Rather much   (!) STRESS CONCERN      12/16/2024   Nutrition   Three or more servings of calcium each day? (!) I DON'T KNOW   Diet: Regular (no restrictions)   How many servings of fruit and vegetables per day? (!) 0-1   How many sweetened beverages each day? 0-1         12/16/2024   Exercise   Days per week of moderate/strenous exercise 3 days   Average minutes spent exercising at this level 10 min         12/16/2024   Social Factors   Frequency of gathering with friends or relatives More than three times a week   Worry food won't last until get money to buy more No   Food not last or not have enough money for food? No   Do you have housing? (Housing is defined as stable permanent housing and does not include staying ouside in a car, in a tent, in an abandoned building, in an overnight shelter, or couch-surfing.) Yes   Are you worried about losing your housing? No   Lack of transportation? No   Unable to get utilities (heat,electricity)? No         12/16/2024   Dental   Dentist two times every year? Yes         12/16/2024   TB Screening   Were you born outside of the [place]? No           Today's PHQ-2 Score:       1/22/2024     9:14  PM   PHQ-2 ( 1999 [employer])   Q1: Little interest or pleasure in doing things 1   Q2: Feeling down, depressed or hopeless 0   PHQ-2 Score 1   Q1: Little interest or pleasure in doing things Several days   Q2: Feeling down, depressed or hopeless Not at all   PHQ-2 Score 1         12/16/2024   Substance Use   Alcohol more than 3/day or more than 7/wk No   Do you use any other substances recreationally? No     Social History     Tobacco Use    Smoking status: Never     Passive exposure: Never    Smokeless tobacco: Never    Tobacco comments:     No smokers in home   Vaping Use    Vaping status: Never Used   Substance Use Topics    Alcohol use: No     Comment: rare/social    Drug use: No           12/16/2024   STI Screening   New sexual partner(s) since last STI/HIV test? No     History of abnormal Pap smear: No - age 21-29 PAP every 3 years recommended             12/16/2024   Contraception/Family Planning   Questions about contraception or family planning No        Reviewed and updated as needed this visit by Provider                          Review of Systems  CONSTITUTIONAL: NEGATIVE for fever, chills, change in weight  INTEGUMENTARY/SKIN: NEGATIVE for worrisome rashes, moles or lesions  EYES: NEGATIVE for vision changes or irritation  ENT/MOUTH: NEGATIVE for ear, mouth and throat problems  RESP: NEGATIVE for significant cough or SOB  BREAST: NEGATIVE for masses, tenderness or discharge  CV: NEGATIVE for chest pain, palpitations or peripheral edema  GI: NEGATIVE for nausea, abdominal pain, heartburn, or change in bowel habits  : NEGATIVE for frequency, dysuria, or hematuria  MUSCULOSKELETAL: NEGATIVE for significant arthralgias or myalgia  NEURO: NEGATIVE for weakness, dizziness or paresthesias  ENDOCRINE: NEGATIVE for temperature intolerance, skin/hair changes  HEME: NEGATIVE for bleeding problems  PSYCHIATRIC: NEGATIVE for changes in mood or affect     Objective    Exam  /64 (BP Location: Right arm, Patient  Position: Sitting, Cuff Size: Adult Regular)   Pulse 88   Temp 98.1  F (36.7  C) (Tympanic)   Resp 18   Ht 1.524 m (5')   Wt 45.8 kg (100 lb 14.4 oz)   SpO2 99%   BMI 19.71 kg/m     Estimated body mass index is 19.71 kg/m  as calculated from the following:    Height as of this encounter: 1.524 m (5').    Weight as of this encounter: 45.8 kg (100 lb 14.4 oz).    Physical Exam  GENERAL: alert and no distress  EYES: Eyes grossly normal to inspection, PERRL and conjunctivae and sclerae normal  HENT: ear canals and TM's normal, nose and mouth without ulcers or lesions  NECK: no adenopathy, no asymmetry, masses, or scars  RESP: lungs clear to auscultation - no rales, rhonchi or wheezes  BREAST: normal without masses, tenderness or nipple discharge and no palpable axillary masses or adenopathy  CV: regular rate and rhythm, normal S1 S2, no S3 or S4, no murmur, click or rub, no peripheral edema  ABDOMEN: soft, nontender, no hepatosplenomegaly, no masses and bowel sounds normal   (female) w/bimanual: normal female external genitalia, normal urethral meatus, normal vaginal mucosa, and normal cervix/adnexa/uterus without masses or discharge  MS: no gross musculoskeletal defects noted, no edema  SKIN: no suspicious lesions or rashes  NEURO: Normal strength and tone, mentation intact and speech normal  PSYCH: mentation appears normal, affect normal/bright        Signed Electronically by: MIAH Muhammad CNP

## 2024-12-19 NOTE — PATIENT INSTRUCTIONS
Patient Education   Preventive Care Advice   This is general advice given by our system to help you stay healthy. However, your care team may have specific advice just for you. Please talk to your care team about your preventive care needs.  Nutrition  Eat 5 or more servings of fruits and vegetables each day.  Try wheat bread, brown rice and whole grain pasta (instead of white bread, rice, and pasta).  Get enough calcium and vitamin D. Check the label on foods and aim for 100% of the RDA (recommended daily allowance).  Lifestyle  Exercise at least 150 minutes each week  (30 minutes a day, 5 days a week).  Do muscle strengthening activities 2 days a week. These help control your weight and prevent disease.  No smoking.  Wear sunscreen to prevent skin cancer.  Have a dental exam and cleaning every 6 months.  Yearly exams  See your health care team every year to talk about:  Any changes in your health.  Any medicines your care team has prescribed.  Preventive care, family planning, and ways to prevent chronic diseases.  Shots (vaccines)   HPV shots (up to age 26), if you've never had them before.  Hepatitis B shots (up to age 59), if you've never had them before.  COVID-19 shot: Get this shot when it's due.  Flu shot: Get a flu shot every year.  Tetanus shot: Get a tetanus shot every 10 years.  Pneumococcal, hepatitis A, and RSV shots: Ask your care team if you need these based on your risk.  Shingles shot (for age 50 and up)  General health tests  Diabetes screening:  Starting at age 35, Get screened for diabetes at least every 3 years.  If you are younger than age 35, ask your care team if you should be screened for diabetes.  Cholesterol test: At age 39, start having a cholesterol test every 5 years, or more often if advised.  Bone density scan (DEXA): At age 50, ask your care team if you should have this scan for osteoporosis (brittle bones).  Hepatitis C: Get tested at least once in your life.  STIs (sexually  transmitted infections)  Before age 24: Ask your care team if you should be screened for STIs.  After age 24: Get screened for STIs if you're at risk. You are at risk for STIs (including HIV) if:  You are sexually active with more than one person.  You don't use condoms every time.  You or a partner was diagnosed with a sexually transmitted infection.  If you are at risk for HIV, ask about PrEP medicine to prevent HIV.  Get tested for HIV at least once in your life, whether you are at risk for HIV or not.  Cancer screening tests  Cervical cancer screening: If you have a cervix, begin getting regular cervical cancer screening tests starting at age 21.  Breast cancer scan (mammogram): If you've ever had breasts, begin having regular mammograms starting at age 40. This is a scan to check for breast cancer.  Colon cancer screening: It is important to start screening for colon cancer at age 45.  Have a colonoscopy test every 10 years (or more often if you're at risk) Or, ask your provider about stool tests like a FIT test every year or Cologuard test every 3 years.  To learn more about your testing options, visit:   .  For help making a decision, visit:   https://bit.ly/nq26290.  Prostate cancer screening test: If you have a prostate, ask your care team if a prostate cancer screening test (PSA) at age 55 is right for you.  Lung cancer screening: If you are a current or former smoker ages 50 to 80, ask your care team if ongoing lung cancer screenings are right for you.  For informational purposes only. Not to replace the advice of your health care provider. Copyright   2023 [facility]. All rights reserved. Clinically reviewed by the [facility]. [employer] [id number] - REV 01/24.  Learning About Stress  What is stress?     Stress is your body's response to a hard situation. Your body can have a physical, emotional, or mental response. Stress is a fact of life for most people, and it  affects everyone differently. What causes stress for you may not be stressful for someone else.  A lot of things can cause stress. You may feel stress when you go on a job interview, take a test, or run a race. This kind of short-term stress is normal and even useful. It can help you if you need to work hard or react quickly. For example, stress can help you finish an important job on time.  Long-term stress is caused by ongoing stressful situations or events. Examples of long-term stress include long-term health problems, ongoing problems at work, or conflicts in your family. Long-term stress can harm your health.  How does stress affect your health?  When you are stressed, your body responds as though you are in danger. It makes hormones that speed up your heart, make you breathe faster, and give you a burst of energy. This is called the fight-or-flight stress response. If the stress is over quickly, your body goes back to normal and no harm is done.  But if stress happens too often or lasts too long, it can have bad effects. Long-term stress can make you more likely to get sick, and it can make symptoms of some diseases worse. If you tense up when you are stressed, you may develop neck, shoulder, or low back pain. Stress is linked to high blood pressure and heart disease.  Stress also harms your emotional health. It can make you khan, tense, or depressed. Your relationships may suffer, and you may not do well at work or school.  What can you do to manage stress?  You can try these things to help manage stress:   Do something active. Exercise or activity can help reduce stress. Walking is a great way to get started. Even everyday activities such as housecleaning or yard work can help.  Try yoga or luis chi. These techniques combine exercise and meditation. You may need some training at first to learn them.  Do something you enjoy. For example, listen to music or go to a movie. Practice your hobby or do volunteer  "work.  Meditate. This can help you relax, because you are not worrying about what happened before or what may happen in the future.  Do guided imagery. Imagine yourself in any setting that helps you feel calm. You can use online videos, books, or a teacher to guide you.  Do breathing exercises. For example:  From a standing position, bend forward from the waist with your knees slightly bent. Let your arms dangle close to the floor.  Breathe in slowly and deeply as you return to a standing position. Roll up slowly and lift your head last.  Hold your breath for just a few seconds in the standing position.  Breathe out slowly and bend forward from the waist.  Let your feelings out. Talk, laugh, cry, and express anger when you need to. Talking with supportive friends or family, a counselor, or a sujata leader about your feelings is a healthy way to relieve stress. Avoid discussing your feelings with people who make you feel worse.  Write. It may help to write about things that are bothering you. This helps you find out how much stress you feel and what is causing it. When you know this, you can find better ways to cope.  What can you do to prevent stress?  You might try some of these things to help prevent stress:  Manage your time. This helps you find time to do the things you want and need to do.  Get enough sleep. Your body recovers from the stresses of the day while you are sleeping.  Get support. Your family, friends, and community can make a difference in how you experience stress.  Limit your news feed. Avoid or limit time on social media or news that may make you feel stressed.  Do something active. Exercise or activity can help reduce stress. Walking is a great way to get started.  Where can you learn more?  Go to https://www.[employer].net/patiented  Enter N032 in the search box to learn more about \"Learning About Stress.\"  Current as of: October 24, 2023  Content Version: 14.3    2024 [employer]. "   Care instructions adapted under license by your healthcare professional. If you have questions about a medical condition or this instruction, always ask your healthcare professional. [employer] disclaims any warranty or liability for your use of this information.

## 2024-12-24 LAB
BKR LAB AP GYN ADEQUACY: NORMAL
BKR LAB AP GYN INTERPRETATION: NORMAL
BKR LAB AP HPV REFLEX: NORMAL
BKR LAB AP LMP: NORMAL
BKR LAB AP PREVIOUS ABNORMAL: NORMAL
PATH REPORT.COMMENTS IMP SPEC: NORMAL
PATH REPORT.COMMENTS IMP SPEC: NORMAL
PATH REPORT.RELEVANT HX SPEC: NORMAL

## 2025-01-28 ENCOUNTER — OFFICE VISIT (OUTPATIENT)
Dept: FAMILY MEDICINE | Facility: CLINIC | Age: 20
End: 2025-01-28
Payer: COMMERCIAL

## 2025-01-28 VITALS
SYSTOLIC BLOOD PRESSURE: 102 MMHG | HEIGHT: 60 IN | TEMPERATURE: 99.1 F | BODY MASS INDEX: 20.4 KG/M2 | DIASTOLIC BLOOD PRESSURE: 78 MMHG | WEIGHT: 103.9 LBS | OXYGEN SATURATION: 99 % | RESPIRATION RATE: 18 BRPM | HEART RATE: 79 BPM

## 2025-01-28 DIAGNOSIS — R53.83 OTHER FATIGUE: ICD-10-CM

## 2025-01-28 DIAGNOSIS — F41.9 ANXIETY: Primary | ICD-10-CM

## 2025-01-28 DIAGNOSIS — R07.89 OTHER CHEST PAIN: ICD-10-CM

## 2025-01-28 LAB
BASOPHILS # BLD AUTO: 0 10E3/UL (ref 0–0.2)
BASOPHILS NFR BLD AUTO: 1 %
EOSINOPHIL # BLD AUTO: 0.1 10E3/UL (ref 0–0.7)
EOSINOPHIL NFR BLD AUTO: 3 %
ERYTHROCYTE [DISTWIDTH] IN BLOOD BY AUTOMATED COUNT: 11.6 % (ref 10–15)
HCT VFR BLD AUTO: 38.1 % (ref 35–47)
HGB BLD-MCNC: 13 G/DL (ref 11.7–15.7)
IMM GRANULOCYTES # BLD: 0 10E3/UL
IMM GRANULOCYTES NFR BLD: 0 %
LYMPHOCYTES # BLD AUTO: 2.1 10E3/UL (ref 0.8–5.3)
LYMPHOCYTES NFR BLD AUTO: 38 %
MCH RBC QN AUTO: 30.4 PG (ref 26.5–33)
MCHC RBC AUTO-ENTMCNC: 34.1 G/DL (ref 31.5–36.5)
MCV RBC AUTO: 89 FL (ref 78–100)
MONOCYTES # BLD AUTO: 0.5 10E3/UL (ref 0–1.3)
MONOCYTES NFR BLD AUTO: 8 %
NEUTROPHILS # BLD AUTO: 2.7 10E3/UL (ref 1.6–8.3)
NEUTROPHILS NFR BLD AUTO: 51 %
PLATELET # BLD AUTO: 277 10E3/UL (ref 150–450)
RBC # BLD AUTO: 4.28 10E6/UL (ref 3.8–5.2)
WBC # BLD AUTO: 5.4 10E3/UL (ref 4–11)

## 2025-01-28 PROCEDURE — 36415 COLL VENOUS BLD VENIPUNCTURE: CPT | Performed by: NURSE PRACTITIONER

## 2025-01-28 PROCEDURE — 85025 COMPLETE CBC W/AUTO DIFF WBC: CPT | Mod: QW | Performed by: NURSE PRACTITIONER

## 2025-01-28 PROCEDURE — 84443 ASSAY THYROID STIM HORMONE: CPT | Performed by: NURSE PRACTITIONER

## 2025-01-28 PROCEDURE — 80048 BASIC METABOLIC PNL TOTAL CA: CPT | Performed by: NURSE PRACTITIONER

## 2025-01-28 PROCEDURE — 99214 OFFICE O/P EST MOD 30 MIN: CPT | Performed by: NURSE PRACTITIONER

## 2025-01-28 RX ORDER — FLUOXETINE 10 MG/1
10 CAPSULE ORAL DAILY
Qty: 30 CAPSULE | Refills: 5 | Status: SHIPPED | OUTPATIENT
Start: 2025-01-28

## 2025-01-28 ASSESSMENT — ANXIETY QUESTIONNAIRES
8. IF YOU CHECKED OFF ANY PROBLEMS, HOW DIFFICULT HAVE THESE MADE IT FOR YOU TO DO YOUR WORK, TAKE CARE OF THINGS AT HOME, OR GET ALONG WITH OTHER PEOPLE?: SOMEWHAT DIFFICULT
1. FEELING NERVOUS, ANXIOUS, OR ON EDGE: MORE THAN HALF THE DAYS
5. BEING SO RESTLESS THAT IT IS HARD TO SIT STILL: SEVERAL DAYS
GAD7 TOTAL SCORE: 9
7. FEELING AFRAID AS IF SOMETHING AWFUL MIGHT HAPPEN: NOT AT ALL
2. NOT BEING ABLE TO STOP OR CONTROL WORRYING: MORE THAN HALF THE DAYS
3. WORRYING TOO MUCH ABOUT DIFFERENT THINGS: MORE THAN HALF THE DAYS
IF YOU CHECKED OFF ANY PROBLEMS ON THIS QUESTIONNAIRE, HOW DIFFICULT HAVE THESE PROBLEMS MADE IT FOR YOU TO DO YOUR WORK, TAKE CARE OF THINGS AT HOME, OR GET ALONG WITH OTHER PEOPLE: SOMEWHAT DIFFICULT
4. TROUBLE RELAXING: SEVERAL DAYS
6. BECOMING EASILY ANNOYED OR IRRITABLE: SEVERAL DAYS
GAD7 TOTAL SCORE: 9

## 2025-01-28 ASSESSMENT — ENCOUNTER SYMPTOMS: NERVOUS/ANXIOUS: 1

## 2025-01-28 NOTE — PROGRESS NOTES
Assessment & Plan     Anxiety  Previously taking Sertraline and stopped on her own and has noticed worsening anxiety off of medication.  Recently on a study abroad trip X 1 month in [place] with anxiety and chest pain started with return travel to the [place] Was drinking a red bull daily.  She would like to try something other than sertraline.  Discussed fluoxetine and common side effects.  Follow up in 1 month or sooner for any new or concerning symptoms.    - FLUoxetine (PROZAC) 10 MG capsule; Take 1 capsule (10 mg) by mouth daily.  - PRIMARY CARE FOLLOW-UP SCHEDULING; Future    Other fatigue  Discussed potential differential diagnoses, including anxiety and/or depression, thyroid disorder, anemia, poor sleep, stress from school, college schedule.  Lab results pending.  - TSH with free T4 reflex; Future  - CBC with Platelets & Differential; Future  - Basic metabolic panel  (Ca, Cl, CO2, Creat, Gluc, K, Na, BUN); Future  - TSH with free T4 reflex  - CBC with Platelets & Differential  - Basic metabolic panel  (Ca, Cl, CO2, Creat, Gluc, K, Na, BUN)    Other chest pain  Burning in the mid chest, not related to activity, worse with deep inhalation. No cough, fever, chills, ST or runny nose.  Lab results pending.  Recommend decreasing or avoiding caffeine and a trial of famotidine for possible reflux.  Symptoms started 5 days ago with travel back to the [place] from [place]. Follow up in 1 month, sooner if symptoms are not improving or for worsening symptoms.   - TSH with free T4 reflex  - CBC with Platelets & Differential  - Basic metabolic panel  (Ca, Cl, CO2, Creat, Gluc, K, Na, BUN)            Brandi Lorenz is a 19 year old, presenting for the following health issues:  Anxiety (Poss anxiety related to chest pain, pt feels a pain when she inhales x 5 days, pt did take zoloft for anxiety last year and stopped and would like to discuss poss getting something that is only as needed)        1/28/2025     3:53 PM  "  Additional Questions   Roomed by JANY Beltre     Gerda is a very pleasant 19-year-old female with history of anxiety who presents today for burning sensation in the chest X 5 days when [place] the [place] from a study abroad trip in [facility].  States she was on Zoloft last year but took herself off.  She is unsure if it was effective and she would like to try different medication.  States the end of last year was very stressful and her anxiety was very high when she was off of the medication and it made her struggle to complete her schoolwork.  Today, states her \"heart is stressed, feels hot sensation in the chest and some pressure.  Symptoms occur at rest and not worsened by activity or exertion.  She denies upper respiratory symptoms.  No cough, fever, chills, nasal congestion, sore throat, headache, lightheadedness or syncopal event.  No concerning cardiac family history.  Does have a family history of reflux.    Relates she was drinking a red bull every day when she was in [facility].  She got back about 4 days ago.  Prior to this was drinking a 200 mg caffeinated drink but not every day.  Usually caffeine does not affect her.      Reports she often feels fatigued and needs to take a nap.  This is a chronic symptom.  Sometimes she has trouble falling asleep at night due to anxiety but most nights she has a full nights rest.  Describes she will often crash and need a nap.  Discussed how caffeine  and diet might be playing a role.     History of Present Illness       Mental Health Follow-up:  Patient presents to follow-up on Anxiety.    Patient's anxiety since last visit has been:  Worse  The patient is having other symptoms associated with anxiety.  Any significant life events: No  Patient is feeling anxious or having panic attacks.  Patient has no concerns about alcohol or drug use.    Reason for visit:  I have been have chest pain but i cant tell if something is wrong or its from stress/anxiety  Symptom onset:  " 3-7 days ago  Symptoms include:  Feels like something is pressing on my chest  Symptom intensity:  Mild  Symptom progression:  Staying the same  Had these symptoms before:  No  What makes it worse:  It stays the same but then gets worse in stressful situations  What makes it better:  Not really   She is taking medications regularly.                 Review of Systems  Constitutional, HEENT, cardiovascular, pulmonary, gi and gu systems are negative, except as otherwise noted.      Objective    /78 (BP Location: Right arm, Patient Position: Sitting, Cuff Size: Adult Regular)   Pulse 79   Temp 99.1  F (37.3  C) (Tympanic)   Resp 18   Ht 1.524 m (5')   Wt 47.1 kg (103 lb 14.4 oz)   SpO2 99%   BMI 20.29 kg/m    Body mass index is 20.29 kg/m .  Physical Exam  Constitutional:       Appearance: Normal appearance. She is not ill-appearing.   Eyes:      Pupils: Pupils are equal, round, and reactive to light.   Cardiovascular:      Rate and Rhythm: Normal rate and regular rhythm.      Heart sounds: No murmur heard.     No friction rub. No gallop.   Pulmonary:      Effort: Pulmonary effort is normal.      Breath sounds: Normal breath sounds.   Skin:     General: Skin is warm and dry.   Neurological:      Mental Status: She is alert and oriented to person, place, and time.   Psychiatric:         Attention and Perception: Attention and perception normal.         Mood and Affect: Mood normal.         Speech: Speech is not rapid and pressured or tangential.         Behavior: Behavior normal.         Thought Content: Thought content normal.         Judgment: Judgment normal.            Results for orders placed or performed in visit on 01/28/25 (from the past 24 hours)   TSH with free T4 reflex   Result Value Ref Range    TSH 1.24 0.30 - 4.20 uIU/mL   CBC with Platelets & Differential    Narrative    The following orders were created for panel order CBC with Platelets & Differential.  Procedure                                Abnormality         Status                     ---------                               -----------         ------                     CBC with platelets and d...[[id number]]                      Final result                 Please view results for these tests on the individual orders.   Basic metabolic panel  (Ca, Cl, CO2, Creat, Gluc, K, Na, BUN)   Result Value Ref Range    Sodium 138 135 - 145 mmol/L    Potassium 4.3 3.4 - 5.3 mmol/L    Chloride 102 98 - 107 mmol/L    Carbon Dioxide (CO2) 26 22 - 29 mmol/L    Anion Gap 10 7 - 15 mmol/L    Urea Nitrogen 11.0 6.0 - 20.0 mg/dL    Creatinine 0.73 0.51 - 0.95 mg/dL    GFR Estimate >90 >60 mL/min/1.73m2    Calcium 9.9 8.8 - 10.4 mg/dL    Glucose 85 70 - 99 mg/dL   CBC with platelets and differential   Result Value Ref Range    WBC Count 5.4 4.0 - 11.0 10e3/uL    RBC Count 4.28 3.80 - 5.20 10e6/uL    Hemoglobin 13.0 11.7 - 15.7 g/dL    Hematocrit 38.1 35.0 - 47.0 %    MCV 89 78 - 100 fL    MCH 30.4 26.5 - 33.0 pg    MCHC 34.1 31.5 - 36.5 g/dL    RDW 11.6 10.0 - 15.0 %    Platelet Count 277 150 - 450 10e3/uL    % Neutrophils 51 %    % Lymphocytes 38 %    % Monocytes 8 %    % Eosinophils 3 %    % Basophils 1 %    % Immature Granulocytes 0 %    Absolute Neutrophils 2.7 1.6 - 8.3 10e3/uL    Absolute Lymphocytes 2.1 0.8 - 5.3 10e3/uL    Absolute Monocytes 0.5 0.0 - 1.3 10e3/uL    Absolute Eosinophils 0.1 0.0 - 0.7 10e3/uL    Absolute Basophils 0.0 0.0 - 0.2 10e3/uL    Absolute Immature Granulocytes 0.0 <=0.4 10e3/uL           Signed Electronically by: MIAH Muhammad CNP

## 2025-01-28 NOTE — PATIENT INSTRUCTIONS
Recommend pepcid (famoditine) 20 mg once to twice daily X 1 week and then as needed for reflux symptoms (warm/burning sensation in chest).  Recommend limiting caffeine intake.

## 2025-01-29 LAB
ANION GAP SERPL CALCULATED.3IONS-SCNC: 10 MMOL/L (ref 7–15)
BUN SERPL-MCNC: 11 MG/DL (ref 6–20)
CALCIUM SERPL-MCNC: 9.9 MG/DL (ref 8.8–10.4)
CHLORIDE SERPL-SCNC: 102 MMOL/L (ref 98–107)
CREAT SERPL-MCNC: 0.73 MG/DL (ref 0.51–0.95)
EGFRCR SERPLBLD CKD-EPI 2021: >90 ML/MIN/1.73M2
GLUCOSE SERPL-MCNC: 85 MG/DL (ref 70–99)
HCO3 SERPL-SCNC: 26 MMOL/L (ref 22–29)
POTASSIUM SERPL-SCNC: 4.3 MMOL/L (ref 3.4–5.3)
SODIUM SERPL-SCNC: 138 MMOL/L (ref 135–145)
TSH SERPL DL<=0.005 MIU/L-ACNC: 1.24 UIU/ML (ref 0.3–4.2)

## 2025-04-01 ENCOUNTER — VIRTUAL VISIT (OUTPATIENT)
Dept: INTERNAL MEDICINE | Facility: CLINIC | Age: 20
End: 2025-04-01
Payer: COMMERCIAL

## 2025-04-01 DIAGNOSIS — Z11.1 SCREENING FOR TUBERCULOSIS: Primary | ICD-10-CM

## 2025-04-01 PROCEDURE — 98005 SYNCH AUDIO-VIDEO EST LOW 20: CPT | Performed by: INTERNAL MEDICINE

## 2025-04-01 NOTE — PROGRESS NOTES
Gerda is a 19 year old who is being evaluated via a billable video visit.    How would you like to obtain your AVS? Board[employer]spectshart  If the video visit is dropped, the invitation should be resent by: Text to cell phone: [phone]  Will anyone else be joining your video visit? No    Assessment & Plan   Screening for tuberculosis  I have placed lab order per patient request and asked patient to schedule a lab-only visit at patient's preferred [facility] facility to obtain these labs. I will contact patient with the results and plan via QRuso once the tests have resulted.  - Quantiferon TB Gold Plus; Future    F/u with regular PCP at regular interval or sooner PRN    Signed Electronically by:  Maninder Garcia MD, MPH  [facility]  Internal Medicine    Subjective   Gerda is a 19 year old presenting for the following health issues: Consult  This is the first time I have met Gerda, who typically gets care at clinics closer to her residence.    HPI   Needs TB gold test orders for [facility]. Denies f/c, cough, night sweats, hemopytsis. No history of bcg vaccine.        Objective     Vitals: No vitals were obtained today due to virtual visit.    Physical Exam   GENERAL: alert and no distress  EYES: Eyes grossly normal to inspection.  No discharge or erythema, or obvious scleral/conjunctival abnormalities.  RESP: No audible wheeze, cough, or visible cyanosis.    SKIN: Visible skin clear. No significant rash, abnormal pigmentation or lesions.  NEURO: Cranial nerves grossly intact.  Mentation and speech appropriate for age.  PSYCH: Appropriate affect, tone, and pace of words    Video-Visit Details  Type of service: Video Visit   Originating Location (pt. Location): Home  Distant Location (provider location):  On-site  Platform used for Video Visit: [employer]

## 2025-04-02 ENCOUNTER — LAB (OUTPATIENT)
Dept: LAB | Facility: CLINIC | Age: 20
End: 2025-04-02
Attending: INTERNAL MEDICINE
Payer: COMMERCIAL

## 2025-04-02 DIAGNOSIS — Z11.1 SCREENING FOR TUBERCULOSIS: ICD-10-CM

## 2025-04-02 PROCEDURE — 86481 TB AG RESPONSE T-CELL SUSP: CPT

## 2025-04-02 PROCEDURE — 36415 COLL VENOUS BLD VENIPUNCTURE: CPT

## 2025-04-03 LAB
GAMMA INTERFERON BACKGROUND BLD IA-ACNC: 0.01 IU/ML
M TB IFN-G BLD-IMP: NEGATIVE
M TB IFN-G CD4+ BCKGRND COR BLD-ACNC: 9.99 IU/ML
MITOGEN IGNF BCKGRD COR BLD-ACNC: 0.01 IU/ML
MITOGEN IGNF BCKGRD COR BLD-ACNC: 0.03 IU/ML
QUANTIFERON MITOGEN: 10 IU/ML
QUANTIFERON NIL TUBE: 0.01 IU/ML
QUANTIFERON TB1 TUBE: 0.02 IU/ML
QUANTIFERON TB2 TUBE: 0.04